# Patient Record
Sex: FEMALE | Race: WHITE | NOT HISPANIC OR LATINO | URBAN - NONMETROPOLITAN AREA
[De-identification: names, ages, dates, MRNs, and addresses within clinical notes are randomized per-mention and may not be internally consistent; named-entity substitution may affect disease eponyms.]

---

## 2023-12-01 ENCOUNTER — OFFICE (OUTPATIENT)
Dept: URBAN - NONMETROPOLITAN AREA CLINIC 1 | Facility: CLINIC | Age: 68
End: 2023-12-01

## 2023-12-01 VITALS — HEIGHT: 67 IN | WEIGHT: 190 LBS

## 2023-12-01 DIAGNOSIS — Z86.010 PERSONAL HISTORY OF COLONIC POLYPS: ICD-10-CM

## 2024-05-28 ENCOUNTER — AMBULATORY SURGICAL CENTER (OUTPATIENT)
Dept: URBAN - NONMETROPOLITAN AREA SURGERY 1 | Facility: SURGERY | Age: 69
End: 2024-05-28
Payer: MEDICARE

## 2024-05-28 ENCOUNTER — OFFICE (OUTPATIENT)
Dept: URBAN - METROPOLITAN AREA PATHOLOGY 15 | Facility: PATHOLOGY | Age: 69
End: 2024-05-28
Payer: MEDICARE

## 2024-05-28 ENCOUNTER — AMBULATORY SURGICAL CENTER (OUTPATIENT)
Dept: URBAN - NONMETROPOLITAN AREA SURGERY 1 | Facility: SURGERY | Age: 69
End: 2024-05-28

## 2024-05-28 VITALS
OXYGEN SATURATION: 97 % | DIASTOLIC BLOOD PRESSURE: 68 MMHG | HEART RATE: 87 BPM | OXYGEN SATURATION: 99 % | WEIGHT: 187 LBS | RESPIRATION RATE: 19 BRPM | HEART RATE: 72 BPM | SYSTOLIC BLOOD PRESSURE: 134 MMHG | RESPIRATION RATE: 18 BRPM | HEIGHT: 67 IN | HEART RATE: 87 BPM | HEART RATE: 86 BPM | RESPIRATION RATE: 21 BRPM | HEART RATE: 72 BPM | SYSTOLIC BLOOD PRESSURE: 145 MMHG | RESPIRATION RATE: 21 BRPM | RESPIRATION RATE: 16 BRPM | DIASTOLIC BLOOD PRESSURE: 79 MMHG | HEART RATE: 87 BPM | TEMPERATURE: 98.4 F | RESPIRATION RATE: 16 BRPM | TEMPERATURE: 98.4 F | RESPIRATION RATE: 18 BRPM | RESPIRATION RATE: 14 BRPM | DIASTOLIC BLOOD PRESSURE: 79 MMHG | SYSTOLIC BLOOD PRESSURE: 156 MMHG | DIASTOLIC BLOOD PRESSURE: 68 MMHG | HEART RATE: 86 BPM | HEART RATE: 78 BPM | DIASTOLIC BLOOD PRESSURE: 69 MMHG | SYSTOLIC BLOOD PRESSURE: 125 MMHG | RESPIRATION RATE: 18 BRPM | HEART RATE: 78 BPM | SYSTOLIC BLOOD PRESSURE: 156 MMHG | DIASTOLIC BLOOD PRESSURE: 75 MMHG | HEIGHT: 67 IN | RESPIRATION RATE: 19 BRPM | DIASTOLIC BLOOD PRESSURE: 69 MMHG | OXYGEN SATURATION: 99 % | HEART RATE: 84 BPM | OXYGEN SATURATION: 99 % | OXYGEN SATURATION: 98 % | HEART RATE: 84 BPM | SYSTOLIC BLOOD PRESSURE: 134 MMHG | OXYGEN SATURATION: 97 % | SYSTOLIC BLOOD PRESSURE: 134 MMHG | HEART RATE: 78 BPM | HEART RATE: 72 BPM | OXYGEN SATURATION: 97 % | SYSTOLIC BLOOD PRESSURE: 125 MMHG | SYSTOLIC BLOOD PRESSURE: 125 MMHG | HEIGHT: 67 IN | SYSTOLIC BLOOD PRESSURE: 156 MMHG | DIASTOLIC BLOOD PRESSURE: 75 MMHG | DIASTOLIC BLOOD PRESSURE: 75 MMHG | SYSTOLIC BLOOD PRESSURE: 145 MMHG | RESPIRATION RATE: 19 BRPM | OXYGEN SATURATION: 98 % | SYSTOLIC BLOOD PRESSURE: 166 MMHG | HEART RATE: 84 BPM | DIASTOLIC BLOOD PRESSURE: 68 MMHG | RESPIRATION RATE: 14 BRPM | RESPIRATION RATE: 14 BRPM | SYSTOLIC BLOOD PRESSURE: 126 MMHG | SYSTOLIC BLOOD PRESSURE: 126 MMHG | DIASTOLIC BLOOD PRESSURE: 79 MMHG | SYSTOLIC BLOOD PRESSURE: 145 MMHG | OXYGEN SATURATION: 98 % | SYSTOLIC BLOOD PRESSURE: 126 MMHG | SYSTOLIC BLOOD PRESSURE: 166 MMHG | RESPIRATION RATE: 16 BRPM | DIASTOLIC BLOOD PRESSURE: 69 MMHG | WEIGHT: 187 LBS | RESPIRATION RATE: 21 BRPM | HEART RATE: 86 BPM | TEMPERATURE: 98.4 F | SYSTOLIC BLOOD PRESSURE: 166 MMHG | WEIGHT: 187 LBS

## 2024-05-28 DIAGNOSIS — Z86.010 PERSONAL HISTORY OF COLONIC POLYPS: ICD-10-CM

## 2024-05-28 DIAGNOSIS — C18.9 MALIGNANT NEOPLASM OF COLON, UNSPECIFIED: ICD-10-CM

## 2024-05-28 DIAGNOSIS — Z09 ENCOUNTER FOR FOLLOW-UP EXAMINATION AFTER COMPLETED TREATMEN: ICD-10-CM

## 2024-05-28 PROBLEM — D12.5 BENIGN NEOPLASM OF SIGMOID COLON: Status: ACTIVE | Noted: 2024-05-28

## 2024-05-28 PROBLEM — K63.5 POLYP OF COLON: Status: ACTIVE | Noted: 2024-05-28

## 2024-05-28 PROCEDURE — 45388 COLONOSCOPY W/ABLATION: CPT | Performed by: INTERNAL MEDICINE

## 2024-05-28 PROCEDURE — 88305 TISSUE EXAM BY PATHOLOGIST: CPT | Performed by: PATHOLOGY

## 2024-05-28 PROCEDURE — 88341 IMHCHEM/IMCYTCHM EA ADD ANTB: CPT | Performed by: PATHOLOGY

## 2024-05-28 PROCEDURE — 45381 COLONOSCOPY SUBMUCOUS NJX: CPT | Performed by: INTERNAL MEDICINE

## 2024-05-28 PROCEDURE — 88342 IMHCHEM/IMCYTCHM 1ST ANTB: CPT | Performed by: PATHOLOGY

## 2024-05-28 RX ADMIN — PROPOFOL 250 MG: 10 INJECTION, EMULSION INTRAVENOUS at 15:07

## 2024-05-28 NOTE — SERVICEHPINOTES
The patient is a very pleasant 68-year-old female 5 ft 7 in tall 190 lb seen in my office for colonoscopy. Patient had a colonoscopy by Dr. Wallace on 07/25/2023 in [place] which showed a large pedunculated polyp which was not carried biopsies showed a villous adenoma. There was another 6 mm polyp of the transverse colon which was a tubular this was done for screening purposes. She has no GI complaints. She is eating fairly well. No postprandial abdominal pain, nausea vomiting. No GERD. No dysphagia. No history of peptic ulcer disease or chronic NSAID use. She has a bowels daily without any diarrhea, constipation blood in the stool.. No anemia or weight lossPast medical history: Large pedunculated adenoma of the sigmoid colon not removed on colonoscopy by Dr. Wallace 7 23 and Linda was another 6 mm villous tubular adenoma transverse colonbrSocial history: Patient has been  for with children. She lives in a house in [facility] for the past 10 years. She moved to [place] from [place]. She is retired. Formally worked as a  for a [employer].brFamily history: Negative For colon cancer. Dad had leukemiabrAssessmentbr- Personal history of colon polypsbrMy Ordersbr- BMI ratio (overweight) discussed with patient and/ or education handouts given.br

## 2024-05-28 NOTE — SERVICEHPINOTES
The patient is a very pleasant 68-year-old female 5 ft 7 in tall 190 lb seen in my office for colonoscopy. Patient had a colonoscopy by Dr. Wallace on 07/25/2023 in [place] which showed a large pedunculated polyp which was not carried biopsies showed a villous adenoma. There was another 6 mm polyp of the transverse colon which was a tubular this was done for screening purposes. She has no GI complaints. She is eating fairly well. No postprandial abdominal pain, nausea vomiting. No GERD. No dysphagia. No history of peptic ulcer disease or chronic NSAID use. She has a bowels daily without any diarrhea, constipation blood in the stool.. No anemia or weight lossPast medical history: Large pedunculated adenoma of the sigmoid colon not removed on colonoscopy by Dr. Wallace 7 23 and Linda was another 6 mm villous tubular adenoma transverse colonbrSocial history: Patient has been  for with children. She lives in a house in [facility] for the past 10 years. She moved to [place] from [place]. She is retired. Formally worked as a  for a [employer].brFamily history: Negative For colon cancer. Dad had leukemiabrAssessmentbr- Personal history of colon polypsbrMy Ordersbr- BMI ratio (overweight) discussed with patient and/ or education handouts given.br

## 2024-05-28 NOTE — SERVICENOTES
the patient was found to have a fairly large polyp of the sigmoid colon which was removed as stated above.  We will follow up the biopsy results.

## 2024-05-28 NOTE — SERVICEHPINOTES
The patient is a very pleasant 68-year-old female 5 ft 7 in tall 190 lb seen in my office for colonoscopy. Patient had a colonoscopy by Dr. Wallace on 07/25/2023 in [place] which showed a large pedunculated polyp which was not carried biopsies showed a villous adenoma. There was another 6 mm polyp of the transverse colon which was a tubular this was done for screening purposes. She has no GI complaints. She is eating fairly well. No postprandial abdominal pain, nausea vomiting. No GERD. No dysphagia. No history of peptic ulcer disease or chronic NSAID use. She has a bowels daily without any diarrhea, constipation blood in the stool.. No anemia or weight lossPast medical history: Large pedunculated adenoma of the sigmoid colon not removed on colonoscopy by Dr. Wallace 7 23 and Linda was another 6 mm villous tubular adenoma transverse colonbrSocial history: Patient has been  for with children. She lives in a house in [facility] for the past 10 years. She moved to [place] from [place]. She is retired. Formally worked as a  for a [employer].brFamily history: Negative For colon cancer. Dad had leukemiabrAssessmentbr- Personal history of colon polypsbrMy Ordersbr- BMI ratio (overweight) discussed with patient and/ or education handouts given.br

## 2024-06-05 LAB
GASTRO ONE PATHOLOGY: PDF REPORT: (no result)

## 2024-06-07 ENCOUNTER — OFFICE (OUTPATIENT)
Dept: URBAN - NONMETROPOLITAN AREA CLINIC 1 | Facility: CLINIC | Age: 69
End: 2024-06-07
Payer: MEDICARE

## 2024-06-07 DIAGNOSIS — C18.9 MALIGNANT NEOPLASM OF COLON, UNSPECIFIED: ICD-10-CM

## 2024-06-07 PROCEDURE — 36415 COLL VENOUS BLD VENIPUNCTURE: CPT | Performed by: INTERNAL MEDICINE

## 2025-03-31 ENCOUNTER — OFFICE (OUTPATIENT)
Dept: URBAN - NONMETROPOLITAN AREA CLINIC 1 | Facility: CLINIC | Age: 70
End: 2025-03-31
Payer: MEDICARE

## 2025-03-31 VITALS
HEART RATE: 63 BPM | SYSTOLIC BLOOD PRESSURE: 164 MMHG | HEIGHT: 66 IN | WEIGHT: 184 LBS | DIASTOLIC BLOOD PRESSURE: 80 MMHG | SYSTOLIC BLOOD PRESSURE: 158 MMHG | DIASTOLIC BLOOD PRESSURE: 77 MMHG

## 2025-03-31 DIAGNOSIS — Z85.038 PERSONAL HISTORY OF OTHER MALIGNANT NEOPLASM OF LARGE INTEST: ICD-10-CM

## 2025-03-31 DIAGNOSIS — Z90.49 ACQUIRED ABSENCE OF OTHER SPECIFIED PARTS OF DIGESTIVE TRACT: ICD-10-CM

## 2025-03-31 DIAGNOSIS — Z09 ENCOUNTER FOR FOLLOW-UP EXAMINATION AFTER COMPLETED TREATMEN: ICD-10-CM

## 2025-03-31 DIAGNOSIS — Z79.899 OTHER LONG TERM (CURRENT) DRUG THERAPY: ICD-10-CM

## 2025-03-31 PROCEDURE — 99213 OFFICE O/P EST LOW 20 MIN: CPT

## 2025-03-31 NOTE — SERVICEHPINOTES
Patient is seen today for a follow up visit, previously seen by Dr. Howe and EMANUEL Raphael NP (LOV 8/2024). Patient had colonoscopy 5/28/24 - dr howe-A single semi-pedunculated 3 cm non-bleeding polyp of multilobular appearance was found in the sigmoid colon at 20 cm from the anus. A piece-meal polypectomy was performed using a hot snare. The polyp was completely removed. The polyp was completely retrieved. A piece-meal polypectomy was performed using a cold snare. The polyp was completely removed. The polyp was completely retrieved. 8 ml epinephrine 1/36282 injection was successfully applied for drug delivery. Bi-cap electrocautery was successfully applied for tissue destruction in the sigmoid colon. One endoclip was successfully applied to the sigmoid colon for the purpose of hemostasis. 5 ml jacqui ink injection was successfully applied for tattooing.brAdditional findings The remainder of the colonoscopy was otherwise normal.br---colon colon polyp shows invasive adenocarcinoma Colonoscopy 1 year 6/2024 ct a/p w-CT scan of the abdomen pelvis showed no evidence of metastatic disease to the liver7/2024 LAR by dr elvira lam --brFinal Pathologic Diagnosis:brSEGMENTAL RESECTION SIGMOID COLON:br- Previous biopsy site with tattooing, no evidence of residual neoplasm (see outside case [facility] [id number])6/2024 CEA 3.5.
br
br    patient was diagnosed with breast cancer January 2025, status post right lumpectomy and undergoing radiation with Dr. Meza at [facility].  intermittent

## 2025-05-01 ENCOUNTER — AMBULATORY SURGICAL CENTER (OUTPATIENT)
Dept: URBAN - NONMETROPOLITAN AREA SURGERY 1 | Facility: SURGERY | Age: 70
End: 2025-05-01
Payer: MEDICARE

## 2025-05-01 ENCOUNTER — OFFICE (OUTPATIENT)
Dept: URBAN - METROPOLITAN AREA PATHOLOGY 15 | Facility: PATHOLOGY | Age: 70
End: 2025-05-01
Payer: MEDICARE

## 2025-05-01 VITALS
WEIGHT: 174 LBS | RESPIRATION RATE: 15 BRPM | DIASTOLIC BLOOD PRESSURE: 64 MMHG | SYSTOLIC BLOOD PRESSURE: 122 MMHG | DIASTOLIC BLOOD PRESSURE: 88 MMHG | RESPIRATION RATE: 21 BRPM | RESPIRATION RATE: 16 BRPM | DIASTOLIC BLOOD PRESSURE: 82 MMHG | RESPIRATION RATE: 14 BRPM | OXYGEN SATURATION: 100 % | SYSTOLIC BLOOD PRESSURE: 122 MMHG | RESPIRATION RATE: 15 BRPM | HEART RATE: 69 BPM | OXYGEN SATURATION: 96 % | WEIGHT: 174 LBS | SYSTOLIC BLOOD PRESSURE: 130 MMHG | RESPIRATION RATE: 16 BRPM | SYSTOLIC BLOOD PRESSURE: 125 MMHG | OXYGEN SATURATION: 97 % | HEART RATE: 70 BPM | SYSTOLIC BLOOD PRESSURE: 125 MMHG | RESPIRATION RATE: 14 BRPM | DIASTOLIC BLOOD PRESSURE: 88 MMHG | HEIGHT: 66 IN | HEART RATE: 68 BPM | TEMPERATURE: 98.6 F | RESPIRATION RATE: 21 BRPM | OXYGEN SATURATION: 96 % | HEART RATE: 83 BPM | DIASTOLIC BLOOD PRESSURE: 91 MMHG | RESPIRATION RATE: 16 BRPM | HEIGHT: 66 IN | RESPIRATION RATE: 15 BRPM | SYSTOLIC BLOOD PRESSURE: 173 MMHG | RESPIRATION RATE: 14 BRPM | SYSTOLIC BLOOD PRESSURE: 122 MMHG | DIASTOLIC BLOOD PRESSURE: 91 MMHG | SYSTOLIC BLOOD PRESSURE: 173 MMHG | TEMPERATURE: 98.5 F | RESPIRATION RATE: 19 BRPM | SYSTOLIC BLOOD PRESSURE: 111 MMHG | TEMPERATURE: 98.6 F | HEART RATE: 68 BPM | TEMPERATURE: 98.5 F | HEART RATE: 83 BPM | HEART RATE: 72 BPM | HEART RATE: 69 BPM | HEART RATE: 70 BPM | DIASTOLIC BLOOD PRESSURE: 79 MMHG | HEART RATE: 72 BPM | OXYGEN SATURATION: 95 % | OXYGEN SATURATION: 97 % | OXYGEN SATURATION: 95 % | SYSTOLIC BLOOD PRESSURE: 150 MMHG | OXYGEN SATURATION: 100 % | HEART RATE: 83 BPM | OXYGEN SATURATION: 95 % | HEART RATE: 68 BPM | OXYGEN SATURATION: 97 % | OXYGEN SATURATION: 96 % | DIASTOLIC BLOOD PRESSURE: 64 MMHG | SYSTOLIC BLOOD PRESSURE: 111 MMHG | DIASTOLIC BLOOD PRESSURE: 79 MMHG | RESPIRATION RATE: 17 BRPM | SYSTOLIC BLOOD PRESSURE: 130 MMHG | HEART RATE: 72 BPM | TEMPERATURE: 98.6 F | WEIGHT: 174 LBS | DIASTOLIC BLOOD PRESSURE: 70 MMHG | HEART RATE: 69 BPM | HEART RATE: 70 BPM | RESPIRATION RATE: 19 BRPM | SYSTOLIC BLOOD PRESSURE: 173 MMHG | SYSTOLIC BLOOD PRESSURE: 150 MMHG | RESPIRATION RATE: 17 BRPM | DIASTOLIC BLOOD PRESSURE: 82 MMHG | RESPIRATION RATE: 21 BRPM | RESPIRATION RATE: 19 BRPM | SYSTOLIC BLOOD PRESSURE: 130 MMHG | SYSTOLIC BLOOD PRESSURE: 125 MMHG | TEMPERATURE: 98.5 F | OXYGEN SATURATION: 100 % | SYSTOLIC BLOOD PRESSURE: 111 MMHG | DIASTOLIC BLOOD PRESSURE: 70 MMHG | DIASTOLIC BLOOD PRESSURE: 79 MMHG | DIASTOLIC BLOOD PRESSURE: 91 MMHG | SYSTOLIC BLOOD PRESSURE: 150 MMHG | RESPIRATION RATE: 17 BRPM | HEIGHT: 66 IN | DIASTOLIC BLOOD PRESSURE: 70 MMHG | DIASTOLIC BLOOD PRESSURE: 82 MMHG | DIASTOLIC BLOOD PRESSURE: 64 MMHG | DIASTOLIC BLOOD PRESSURE: 88 MMHG

## 2025-05-01 DIAGNOSIS — D12.2 BENIGN NEOPLASM OF ASCENDING COLON: ICD-10-CM

## 2025-05-01 DIAGNOSIS — Z08 ENCOUNTER FOR FOLLOW-UP EXAMINATION AFTER COMPLETED TREATMEN: ICD-10-CM

## 2025-05-01 DIAGNOSIS — Z90.49 ACQUIRED ABSENCE OF OTHER SPECIFIED PARTS OF DIGESTIVE TRACT: ICD-10-CM

## 2025-05-01 DIAGNOSIS — Z85.038 PERSONAL HISTORY OF OTHER MALIGNANT NEOPLASM OF LARGE INTEST: ICD-10-CM

## 2025-05-01 DIAGNOSIS — Z86.0101 PERSONAL HISTORY OF ADENOMATOUS AND SERRATED COLON POLYPS: ICD-10-CM

## 2025-05-01 PROCEDURE — 45385 COLONOSCOPY W/LESION REMOVAL: CPT | Mod: PT | Performed by: STUDENT IN AN ORGANIZED HEALTH CARE EDUCATION/TRAINING PROGRAM

## 2025-05-01 PROCEDURE — 45385 COLONOSCOPY W/LESION REMOVAL: CPT | Performed by: STUDENT IN AN ORGANIZED HEALTH CARE EDUCATION/TRAINING PROGRAM

## 2025-05-01 PROCEDURE — 88305 TISSUE EXAM BY PATHOLOGIST: CPT | Performed by: PATHOLOGY

## 2025-05-01 RX ADMIN — PROPOFOL 550 MG: 10 INJECTION, EMULSION INTRAVENOUS at 10:14

## 2025-05-05 LAB
GASTRO ONE PATHOLOGY: PDF REPORT: (no result)